# Patient Record
Sex: FEMALE | Race: OTHER | Employment: UNEMPLOYED | URBAN - METROPOLITAN AREA
[De-identification: names, ages, dates, MRNs, and addresses within clinical notes are randomized per-mention and may not be internally consistent; named-entity substitution may affect disease eponyms.]

---

## 2024-11-14 ENCOUNTER — OFFICE VISIT (OUTPATIENT)
Dept: GASTROENTEROLOGY | Age: 34
End: 2024-11-14
Payer: COMMERCIAL

## 2024-11-14 VITALS
HEART RATE: 68 BPM | TEMPERATURE: 98 F | BODY MASS INDEX: 25.61 KG/M2 | SYSTOLIC BLOOD PRESSURE: 130 MMHG | RESPIRATION RATE: 15 BRPM | OXYGEN SATURATION: 97 % | HEIGHT: 64 IN | DIASTOLIC BLOOD PRESSURE: 79 MMHG | WEIGHT: 150 LBS

## 2024-11-14 DIAGNOSIS — R10.84 GENERALIZED ABDOMINAL PAIN: ICD-10-CM

## 2024-11-14 DIAGNOSIS — K21.9 GASTROESOPHAGEAL REFLUX DISEASE, UNSPECIFIED WHETHER ESOPHAGITIS PRESENT: Primary | ICD-10-CM

## 2024-11-14 PROCEDURE — 99203 OFFICE O/P NEW LOW 30 MIN: CPT | Performed by: STUDENT IN AN ORGANIZED HEALTH CARE EDUCATION/TRAINING PROGRAM

## 2024-11-14 RX ORDER — ESOMEPRAZOLE MAGNESIUM 40 MG/1
40 CAPSULE, DELAYED RELEASE ORAL
Qty: 180 CAPSULE | Refills: 1 | Status: SHIPPED | OUTPATIENT
Start: 2024-11-14 | End: 2025-05-13

## 2024-11-14 NOTE — PROGRESS NOTES
Marilyn Bravo is 34 y.o. y/o female here for initial evaluation.     She reports that she has had in the past short of breath that was intermittent and lasted for an month, this short of breath will get worse with activity for which she has done different studies including PFTs.  She also reports constant heartburn/epigastric pain/chest pain that did not respond to omeprazole in the past and currently she is trying to use Zantac.  Denies any dysphagia or any other GI complaints at this time.    No procedures noted in the chart review.     US Abdomen Right Upper Quadrant  3/2018  Impression     Unremarkable right upper quadrant ultrasound      No results found for: \"HGB\", \"WBC\", \"PLT\", \"MCV\", \"IRON\", \"FERRITIN\", \"TIBC\", \"CREATININE\", \"CMP\", \"ALT\", \"AST\"    No past medical history on file.  No past surgical history on file.  No family history on file.     Not on File  No current outpatient medications  Review of Systems    ROS:    A complete 11 system ROS was performed and was negative aside from the pertinent negative and positives noted above.     PE:   There were no vitals filed for this visit.   General:  The patient appears well-nourished, and is in no acute distress.    Skin:  no rash, ulcers. No Bleeding or signs of infection.  HEENT:  Normocephalic, atraumatic. No sclerae icterus.   Neck:  No pain on palpation or mobilization of the neck.  Respiratory: Respiratory effort is normal. Expansion maintained bilaterally and symmetrically. Normal breath sounds and clear to auscultation bilaterally without wheezes, rales, or rhonchi.    Cardiovascular:  Regular rate and rhythm.     Abdomen:  Soft, non tender to palpation. No distention. Normoactive bowel sounds present.    Extremities: No edema bilaterally. No erythema  Neurologic:  Alert and oriented x3.  No sensory deficits. No asterixis  Psychiatric: Appropriate mood and affect.  Musculoskeletal: Strength and tone are symmetrical and maintained.    Assessment and

## 2024-11-14 NOTE — PATIENT INSTRUCTIONS
Nexium 40 mg gunde iki defa (30 dakika once yemeklerden)   Gasviscon veya Tums veya famotidine veya papaya extract elizabeth mayes.

## 2024-11-15 LAB — UREA BREATH TEST QL: POSITIVE

## 2024-11-18 ENCOUNTER — TELEPHONE (OUTPATIENT)
Dept: GASTROENTEROLOGY | Age: 34
End: 2024-11-18

## 2024-11-18 DIAGNOSIS — A04.8 BACTERIAL INFECTION DUE TO H. PYLORI: Primary | ICD-10-CM

## 2024-11-18 RX ORDER — CLARITHROMYCIN 500 MG/1
500 TABLET ORAL 2 TIMES DAILY
Qty: 28 TABLET | Refills: 0 | Status: SHIPPED | OUTPATIENT
Start: 2024-11-18 | End: 2024-12-02

## 2024-11-18 RX ORDER — AMOXICILLIN 500 MG/1
1000 CAPSULE ORAL 2 TIMES DAILY
Qty: 56 CAPSULE | Refills: 0 | Status: SHIPPED | OUTPATIENT
Start: 2024-11-18 | End: 2024-12-02

## 2024-11-18 NOTE — TELEPHONE ENCOUNTER
Called and left voicemail informing pt with assistance of language services that per Dr. Barrera's recommendation:    \"We can tell the patient that H.pylori test is positive, this bacteria can cause ulcers or gastritis therefore we need to treat it at this time with two antibiotics and PPI. After treatment patient needs repeat H.pylori breath test in 3 months to ensure that bacteria is cleared.      Antibiotics as below if no penicillin allergy:   Amoxicillin 1g BID for 14 days  Clarithromycin 500 mg BID for 14 days     During antibiotic treatment he needs to stay on PPI twice daily (pantoprazole/omeprazole etc.). \"    Ordered antibiotics, pt is already taking PPI. Placed repeat order for H. Pylori breath test in 3 months.    ----- Message from Dr. Stephen Barrera MD sent at 11/18/2024  9:49 AM EST -----  We can tell the patient that H.pylori test is positive, this bacteria can cause ulcers or gastritis therefore we need to treat it at this time with two antibiotics and omeprazole. After treatment patient needs repeat H.pylori breath test in 3 months to ensure that bacteria is cleared.     Antibiotics as below if no penicillin allergy:   Amoxicillin 1g BID for 14 days  Clarithromycin 500 mg BID for 14 days    Antibiotics as below if there is penicillin allergy:   Metronidazole 500 mg three times daily for 14 days  Clarithromycin 500 mg BID for 14 days    During antibiotic treatment he needs to stay on PPI twice daily (pantoprazole/omeprazole etc.).

## 2025-04-10 DIAGNOSIS — A04.8 BACTERIAL INFECTION DUE TO H. PYLORI: Primary | ICD-10-CM

## 2025-04-10 RX ORDER — FAMOTIDINE 40 MG/1
40 TABLET, FILM COATED ORAL EVERY EVENING
Qty: 30 TABLET | Refills: 3 | Status: SHIPPED | OUTPATIENT
Start: 2025-04-10

## 2025-04-10 RX ORDER — OMEPRAZOLE 40 MG/1
40 CAPSULE, DELAYED RELEASE ORAL
Qty: 180 CAPSULE | Refills: 1 | Status: SHIPPED | OUTPATIENT
Start: 2025-04-10

## 2025-05-13 DIAGNOSIS — A04.8 BACTERIAL INFECTION DUE TO H. PYLORI: ICD-10-CM

## 2025-05-14 LAB — UREA BREATH TEST QL: NEGATIVE

## 2025-05-16 ENCOUNTER — RESULTS FOLLOW-UP (OUTPATIENT)
Dept: GASTROENTEROLOGY | Age: 35
End: 2025-05-16

## 2025-05-21 ENCOUNTER — TELEPHONE (OUTPATIENT)
Dept: GASTROENTEROLOGY | Age: 35
End: 2025-05-21

## 2025-05-21 NOTE — TELEPHONE ENCOUNTER
Called patient using [facility] ,  with H. Pylori breath test per Dr Barrera\":    \"tell her that H.pylori is treated successfully.\"    H. Pylori Breath Test 5/13/2025  H Pylori Breath Test Negative       Patient's  answered, Stacia (on [facility]). No  needed. Advised on results, offered follow up appointment.  politely refused as patient is feeling better.

## 2025-09-04 ENCOUNTER — TELEMEDICINE (OUTPATIENT)
Dept: GASTROENTEROLOGY | Age: 35
End: 2025-09-04
Payer: COMMERCIAL

## 2025-09-04 DIAGNOSIS — R10.84 GENERALIZED ABDOMINAL PAIN: Primary | ICD-10-CM

## 2025-09-04 PROCEDURE — 99214 OFFICE O/P EST MOD 30 MIN: CPT | Performed by: STUDENT IN AN ORGANIZED HEALTH CARE EDUCATION/TRAINING PROGRAM

## 2025-09-04 RX ORDER — NORTRIPTYLINE HYDROCHLORIDE 25 MG/1
25 CAPSULE ORAL NIGHTLY
Qty: 30 CAPSULE | Refills: 3 | Status: SHIPPED | OUTPATIENT
Start: 2025-09-04

## 2025-09-05 PROBLEM — K21.9 GERD (GASTROESOPHAGEAL REFLUX DISEASE): Status: ACTIVE | Noted: 2025-09-05

## 2025-09-05 PROBLEM — R10.9 ABDOMINAL PAIN: Status: ACTIVE | Noted: 2025-09-05
